# Patient Record
Sex: MALE | Race: WHITE | Employment: OTHER | URBAN - NONMETROPOLITAN AREA
[De-identification: names, ages, dates, MRNs, and addresses within clinical notes are randomized per-mention and may not be internally consistent; named-entity substitution may affect disease eponyms.]

---

## 2018-02-01 ENCOUNTER — DOCUMENTATION ONLY (OUTPATIENT)
Dept: FAMILY MEDICINE | Facility: OTHER | Age: 53
End: 2018-02-01

## 2018-02-01 PROBLEM — F41.9 ANXIETY: Status: ACTIVE | Noted: 2018-02-01

## 2018-02-01 PROBLEM — S09.90XA: Status: ACTIVE | Noted: 2018-02-01

## 2018-02-01 PROBLEM — F10.11 HISTORY OF ALCOHOL ABUSE: Status: ACTIVE | Noted: 2018-02-01

## 2018-02-01 PROBLEM — Z87.898 HISTORY OF SEIZURE: Status: ACTIVE | Noted: 2018-02-01

## 2018-02-01 PROBLEM — I10 HYPERTENSION: Status: ACTIVE | Noted: 2018-02-01

## 2018-02-01 PROBLEM — S02.91XA: Status: ACTIVE | Noted: 2018-02-01

## 2018-02-01 RX ORDER — BUPROPION HYDROCHLORIDE 150 MG/1
1 TABLET, EXTENDED RELEASE ORAL 2 TIMES DAILY
COMMUNITY
Start: 2016-06-09 | End: 2018-10-05

## 2018-02-01 RX ORDER — LISDEXAMFETAMINE DIMESYLATE 60 MG/1
CAPSULE ORAL
COMMUNITY
Start: 2016-08-18 | End: 2018-10-05

## 2018-02-01 RX ORDER — LEVETIRACETAM 750 MG/1
1500 TABLET, FILM COATED, EXTENDED RELEASE ORAL AT BEDTIME
COMMUNITY
Start: 2016-08-18 | End: 2018-10-05

## 2018-02-01 RX ORDER — KETOCONAZOLE 20 MG/ML
SHAMPOO TOPICAL
COMMUNITY
Start: 2016-09-09 | End: 2018-10-05

## 2018-09-12 ENCOUNTER — APPOINTMENT (OUTPATIENT)
Dept: CT IMAGING | Facility: OTHER | Age: 53
End: 2018-09-12
Attending: FAMILY MEDICINE
Payer: MEDICAID

## 2018-09-12 ENCOUNTER — HOSPITAL ENCOUNTER (OUTPATIENT)
Facility: OTHER | Age: 53
Setting detail: OBSERVATION
Discharge: HOME | End: 2018-09-13
Attending: FAMILY MEDICINE | Admitting: INTERNAL MEDICINE
Payer: MEDICAID

## 2018-09-12 ENCOUNTER — APPOINTMENT (OUTPATIENT)
Dept: GENERAL RADIOLOGY | Facility: OTHER | Age: 53
End: 2018-09-12
Attending: FAMILY MEDICINE
Payer: MEDICAID

## 2018-09-12 DIAGNOSIS — G40.909 RECURRENT SEIZURES (H): ICD-10-CM
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
## 2018-09-12 LAB
ALBUMIN SERPL-MCNC: 4.8 G/DL (ref 3.5–5.7)
ALP SERPL-CCNC: 107 U/L (ref 34–104)
ALT SERPL W P-5'-P-CCNC: 33 U/L (ref 7–52)
ANION GAP SERPL CALCULATED.3IONS-SCNC: 20 MMOL/L (ref 3–14)
AST SERPL W P-5'-P-CCNC: 41 U/L (ref 13–39)
BASOPHILS # BLD AUTO: 0.1 10E9/L (ref 0–0.2)
BASOPHILS NFR BLD AUTO: 0.8 %
BILIRUB SERPL-MCNC: 0.4 MG/DL (ref 0.3–1)
BUN SERPL-MCNC: 24 MG/DL (ref 7–25)
CALCIUM SERPL-MCNC: 10.6 MG/DL (ref 8.6–10.3)
CHLORIDE SERPL-SCNC: 100 MMOL/L (ref 98–107)
CO2 SERPL-SCNC: 18 MMOL/L (ref 21–31)
CREAT SERPL-MCNC: 1.46 MG/DL (ref 0.7–1.3)
DIFFERENTIAL METHOD BLD: ABNORMAL
EOSINOPHIL # BLD AUTO: 0 10E9/L (ref 0–0.7)
EOSINOPHIL NFR BLD AUTO: 0.2 %
ERYTHROCYTE [DISTWIDTH] IN BLOOD BY AUTOMATED COUNT: 13 % (ref 10–15)
ETHANOL SERPL-MCNC: <0.01 %
GFR SERPL CREATININE-BSD FRML MDRD: 50 ML/MIN/1.7M2
GLUCOSE SERPL-MCNC: 137 MG/DL (ref 70–105)
HCT VFR BLD AUTO: 41.8 % (ref 40–53)
HGB BLD-MCNC: 14.5 G/DL (ref 13.3–17.7)
IMM GRANULOCYTES # BLD: 0 10E9/L (ref 0–0.4)
IMM GRANULOCYTES NFR BLD: 0.3 %
LYMPHOCYTES # BLD AUTO: 2.3 10E9/L (ref 0.8–5.3)
LYMPHOCYTES NFR BLD AUTO: 16.1 %
MAGNESIUM SERPL-MCNC: 2.1 MG/DL (ref 1.9–2.7)
MCH RBC QN AUTO: 31.3 PG (ref 26.5–33)
MCHC RBC AUTO-ENTMCNC: 34.7 G/DL (ref 31.5–36.5)
MCV RBC AUTO: 90 FL (ref 78–100)
MONOCYTES # BLD AUTO: 0.9 10E9/L (ref 0–1.3)
MONOCYTES NFR BLD AUTO: 6.3 %
NEUTROPHILS # BLD AUTO: 11.1 10E9/L (ref 1.6–8.3)
NEUTROPHILS NFR BLD AUTO: 76.3 %
PLATELET # BLD AUTO: 267 10E9/L (ref 150–450)
POTASSIUM SERPL-SCNC: 4.3 MMOL/L (ref 3.5–5.1)
PROT SERPL-MCNC: 8.3 G/DL (ref 6.4–8.9)
RBC # BLD AUTO: 4.63 10E12/L (ref 4.4–5.9)
SODIUM SERPL-SCNC: 138 MMOL/L (ref 134–144)
WBC # BLD AUTO: 14.6 10E9/L (ref 4–11)

## 2018-09-12 PROCEDURE — 99291 CRITICAL CARE FIRST HOUR: CPT | Mod: 25 | Performed by: FAMILY MEDICINE

## 2018-09-12 PROCEDURE — 80320 DRUG SCREEN QUANTALCOHOLS: CPT | Performed by: FAMILY MEDICINE

## 2018-09-12 PROCEDURE — 12013 RPR F/E/E/N/L/M 2.6-5.0 CM: CPT | Mod: Z6 | Performed by: FAMILY MEDICINE

## 2018-09-12 PROCEDURE — 12013 RPR F/E/E/N/L/M 2.6-5.0 CM: CPT | Performed by: FAMILY MEDICINE

## 2018-09-12 PROCEDURE — 80053 COMPREHEN METABOLIC PANEL: CPT | Performed by: FAMILY MEDICINE

## 2018-09-12 PROCEDURE — 73030 X-RAY EXAM OF SHOULDER: CPT | Mod: LT

## 2018-09-12 PROCEDURE — 99220 ZZC INITIAL OBSERVATION CARE,LEVL III: CPT | Performed by: INTERNAL MEDICINE

## 2018-09-12 PROCEDURE — G0378 HOSPITAL OBSERVATION PER HR: HCPCS

## 2018-09-12 PROCEDURE — 83735 ASSAY OF MAGNESIUM: CPT | Performed by: INTERNAL MEDICINE

## 2018-09-12 PROCEDURE — 36415 COLL VENOUS BLD VENIPUNCTURE: CPT | Performed by: FAMILY MEDICINE

## 2018-09-12 PROCEDURE — 71045 X-RAY EXAM CHEST 1 VIEW: CPT

## 2018-09-12 PROCEDURE — 85025 COMPLETE CBC W/AUTO DIFF WBC: CPT | Performed by: FAMILY MEDICINE

## 2018-09-12 PROCEDURE — 72125 CT NECK SPINE W/O DYE: CPT

## 2018-09-12 PROCEDURE — 70450 CT HEAD/BRAIN W/O DYE: CPT

## 2018-09-12 PROCEDURE — 25000132 ZZH RX MED GY IP 250 OP 250 PS 637: Performed by: INTERNAL MEDICINE

## 2018-09-12 RX ORDER — BISACODYL 10 MG
10 SUPPOSITORY, RECTAL RECTAL DAILY PRN
Status: DISCONTINUED | OUTPATIENT
Start: 2018-09-12 | End: 2018-09-13 | Stop reason: HOSPADM

## 2018-09-12 RX ORDER — ONDANSETRON 4 MG/1
4 TABLET, ORALLY DISINTEGRATING ORAL EVERY 6 HOURS PRN
Status: DISCONTINUED | OUTPATIENT
Start: 2018-09-12 | End: 2018-09-13 | Stop reason: HOSPADM

## 2018-09-12 RX ORDER — ACETAMINOPHEN 325 MG/1
650 TABLET ORAL EVERY 4 HOURS PRN
Status: DISCONTINUED | OUTPATIENT
Start: 2018-09-12 | End: 2018-09-13 | Stop reason: HOSPADM

## 2018-09-12 RX ORDER — AMOXICILLIN 250 MG
1 CAPSULE ORAL 2 TIMES DAILY PRN
Status: DISCONTINUED | OUTPATIENT
Start: 2018-09-12 | End: 2018-09-13 | Stop reason: HOSPADM

## 2018-09-12 RX ORDER — LEVETIRACETAM 500 MG/1
500 TABLET ORAL 2 TIMES DAILY
Status: DISCONTINUED | OUTPATIENT
Start: 2018-09-12 | End: 2018-09-13 | Stop reason: HOSPADM

## 2018-09-12 RX ORDER — POTASSIUM CHLORIDE 1500 MG/1
20-40 TABLET, EXTENDED RELEASE ORAL
Status: DISCONTINUED | OUTPATIENT
Start: 2018-09-12 | End: 2018-09-13 | Stop reason: HOSPADM

## 2018-09-12 RX ORDER — MAGNESIUM SULFATE HEPTAHYDRATE 40 MG/ML
4 INJECTION, SOLUTION INTRAVENOUS EVERY 4 HOURS PRN
Status: DISCONTINUED | OUTPATIENT
Start: 2018-09-12 | End: 2018-09-13 | Stop reason: HOSPADM

## 2018-09-12 RX ORDER — LIDOCAINE 40 MG/G
CREAM TOPICAL
Status: DISCONTINUED | OUTPATIENT
Start: 2018-09-12 | End: 2018-09-13 | Stop reason: HOSPADM

## 2018-09-12 RX ORDER — PROCHLORPERAZINE MALEATE 10 MG
10 TABLET ORAL EVERY 6 HOURS PRN
Status: DISCONTINUED | OUTPATIENT
Start: 2018-09-12 | End: 2018-09-13 | Stop reason: HOSPADM

## 2018-09-12 RX ORDER — AMOXICILLIN 250 MG
2 CAPSULE ORAL 2 TIMES DAILY PRN
Status: DISCONTINUED | OUTPATIENT
Start: 2018-09-12 | End: 2018-09-13 | Stop reason: HOSPADM

## 2018-09-12 RX ORDER — NALOXONE HYDROCHLORIDE 0.4 MG/ML
.1-.4 INJECTION, SOLUTION INTRAMUSCULAR; INTRAVENOUS; SUBCUTANEOUS
Status: DISCONTINUED | OUTPATIENT
Start: 2018-09-12 | End: 2018-09-13 | Stop reason: HOSPADM

## 2018-09-12 RX ORDER — ONDANSETRON 2 MG/ML
4 INJECTION INTRAMUSCULAR; INTRAVENOUS EVERY 6 HOURS PRN
Status: DISCONTINUED | OUTPATIENT
Start: 2018-09-12 | End: 2018-09-13 | Stop reason: HOSPADM

## 2018-09-12 RX ORDER — POTASSIUM CHLORIDE 7.45 MG/ML
10 INJECTION INTRAVENOUS
Status: DISCONTINUED | OUTPATIENT
Start: 2018-09-12 | End: 2018-09-13 | Stop reason: HOSPADM

## 2018-09-12 RX ORDER — PROCHLORPERAZINE 25 MG
25 SUPPOSITORY, RECTAL RECTAL EVERY 12 HOURS PRN
Status: DISCONTINUED | OUTPATIENT
Start: 2018-09-12 | End: 2018-09-13 | Stop reason: HOSPADM

## 2018-09-12 RX ORDER — ACETAMINOPHEN 650 MG/1
650 SUPPOSITORY RECTAL EVERY 4 HOURS PRN
Status: DISCONTINUED | OUTPATIENT
Start: 2018-09-12 | End: 2018-09-13 | Stop reason: HOSPADM

## 2018-09-12 RX ORDER — NICOTINE 21 MG/24HR
1 PATCH, TRANSDERMAL 24 HOURS TRANSDERMAL DAILY
Status: DISCONTINUED | OUTPATIENT
Start: 2018-09-12 | End: 2018-09-13 | Stop reason: HOSPADM

## 2018-09-12 RX ADMIN — ACETAMINOPHEN 650 MG: 325 TABLET, FILM COATED ORAL at 19:31

## 2018-09-12 RX ADMIN — ACETAMINOPHEN 650 MG: 325 TABLET, FILM COATED ORAL at 23:24

## 2018-09-12 RX ADMIN — LEVETIRACETAM 500 MG: 500 TABLET ORAL at 21:18

## 2018-09-12 RX ADMIN — NICOTINE 1 PATCH: 14 PATCH TRANSDERMAL at 19:30

## 2018-09-12 NOTE — H&P
[facility]    History and Physical  Hospitalist       Date of Admission:  9/12/2018    Assessment & Plan   Omar Barrios is a 53 year old male who presents with a fall and seizure.    Active Problems:    Fall at home    Assessment: Is unclear from any history that we can obtain whether the patient fell first, hit his head and had a seizure or had a seizure and then fell.  He has been off his Keppra seizure medication since at least May 2018.  He has a large hematoma and laceration on his forehead.  He was quite groggy initially which could have been postconcussion or post ictal state.  He is now answering questions appropriately and interacting appropriately.    Plan: Observation status.  Consult general surgery for trauma.  Restart Keppra at 500 mg twice daily with a target of 1500 mg twice daily over the next month.    Post-concussion syndrome  Assessment: secondary to fall and striking head    Seizure disorder  Assessment: non compliance with antiepileptics.   Plan: Patient will restart his Keppra. No driving.    Shoulder pain  Assessment: Probably bruise from fall.  Shoulder x-ray does not rule out possibility of fracture.    Plan: if pain continues, CT of the shoulder.    # Pain Assessment:  Current Pain Score 9/12/2018   Pain score (0-10) 0   - Omar is experiencing pain due to fall. Pain management was discussed and the plan was created in a collaborative fashion.  Omar's response to the current recommendations: engaged  - Opioid regimen: oxycodone  - Response to opioid medications: Reduction of symptoms   - Bowel regimen: not needed        DVT Prophylaxis: Pneumatic Compression Devices  Code Status: No Order    Charles Weaver    Primary Care Physician   [place]  Chief Complaint   Fall, head trauma    History is obtained from the patient and chart review.    History of Present Illness   Omar Barrios is a 53 year old male who presents with a fall and striking his head on what  appears to be the edge of the oven in the kitchen.  So had seizure-like activity while on the ground after the fall and was postictal and not very responsive.  The fall was not witnessed.  It is unclear if the seizure started first and caused the fall or if the fall and striking the head initiated a seizure.  He presented to the emergency department with a large forehead laceration that was sutured by Dr. Walters.  Patient had a difficult time giving any history and was not very responsive initially.  This is improved during his hospital stay and he was alert and visiting with me when I saw him.  He takes no blood thinners on a chronic basis.  He takes no medications at this time, and is reportedly off of any prescription medications since at least May 2018.  This includes the Keppra 1500 mg twice daily.  He has a history of drug use but denies using anything for at least the past 30 days.  He recently was released from intermediate.    Past Medical History    I have reviewed this patient's medical history and updated it with pertinent information if needed.   Complex partial seizures  MORGAN  hypertension   Tobacco use      Past Surgical History   I have reviewed this patient's surgical history and updated it with pertinent information if needed.  No past surgical history on file.    Prior to Admission Medications   None     Allergies   No Known Allergies    Social History   I have reviewed this patient's social history and updated it with pertinent information if needed. Omar Barrios      Family History   I have reviewed this patient's family history and updated it with pertinent information if needed.   No family history on file.    Review of Systems   Review Of Systems  Skin: positive for bruising, forehead lac   Eyes: negative  Ears/Nose/Throat: negative  Respiratory: No shortness of breath, dyspnea on exertion, cough, or hemoptysis  Cardiovascular: negative  Gastrointestinal: negative  Genitourinary:  negative  Musculoskeletal: left shoulder pain  Neurologic: seizures  Psychiatric: negative  Hematologic/Lymphatic/Immunologic: negative  Endocrine: negative      Physical Exam   Temp: 96  F (35.6  C) Temp src: Temporal BP: (!) 135/99 Pulse: 144 Heart Rate: 124 Resp: 10 SpO2: 97 % O2 Device: None (Room air)    Vital Signs with Ranges  Temp:  [96  F (35.6  C)] 96  F (35.6  C)  Pulse:  [144] 144  Heart Rate:  [116-134] 124  Resp:  [8-24] 10  BP: (117-158)/() 135/99  SpO2:  [94 %-99 %] 97 %  180 lbs 0 oz    Constitutional: In no apparent distress  Eyes: pupils reactive, extraocular movements intact. Anicteric sclera.   HEENT: TM's normal, oropharynx nonerythematous. Neck supple, no JVD.  Respiratory: no crackles noted, no wheezes.  Cardiovascular: regular, no murmur. No lower extremity edema.  GI: soft, non-tender, bowel sounds present.  Lymph/Hematologic: no cervical or supraclavicular LAD.  Genitourinary: deferred  Skin: brusing over forehead and left face  Musculoskeletal: no joint erythema or swelling  Neurologic: cranial nerves symmetric. Neuro exam nonfocal  Psychiatric: alert and oriented x3. Interactive.       Data   Data reviewed today:  I personally reviewed labs from today. Imaging reports as below.     Recent Labs  Lab 09/12/18  1346   WBC 14.6*   HGB 14.5   MCV 90         POTASSIUM 4.3   CHLORIDE 100   CO2 18*   BUN 24   CR 1.46*   ANIONGAP 20*   JONES 10.6*   *   ALBUMIN 4.8   PROTTOTAL 8.3   BILITOTAL 0.4   ALKPHOS 107*   ALT 33   AST 41*       Recent Results (from the past 24 hour(s))   CT Head w/o Contrast    Narrative    PROCEDURE: CT HEAD W/O CONTRAST     HISTORY: Seizure with trauma from falling and hitting head; .    COMPARISON: None.    TECHNIQUE: Helical Images were obtained from the skull base to the  vertex. Axial, coronal and sagittal images were reviewed.     FINDINGS: There is mild, diffuse atrophy. No acute intracranial  hemorrhage, mass effect,  or midline  shift.    The grey-white matter interface is preserved. Scattered  hypoattenuation within the white matter is most compatible with  microvascular ischemic change. Encephalomalacia is noted in the left  inferior temporal region.    The mastoid air cells are clear.  There are postoperative changes from  right frontoparietal craniotomy. No acute skull fractures are seen.       Impression    IMPRESSION: No acute intracranial findings.      ROGER DUARTE MD   CT Cervical Spine w/o Contrast    Narrative    PROCEDURE: CT CERVICAL SPINE W/O CONTRAST     HISTORY: Seizure which led to a fall striking his head; .    TECHNIQUE: Helical noncontrast CT images of the cervical spine.    COMPARISON: None.    FINDINGS:     No acute fracture is identified. The vertebral bodies are normal in  height. The cervical lordosis is straightened. The C1-2 articulation  and the craniocervical junction are intact.     Diffuse cervical degenerative changes are present, most pronounced at  C6-7 and C7-T1 without severe spinal stenosis. Right foraminal  stenosis at C7-T1 is moderate to severe.     The paravertebral soft tissues are unremarkable. The lung apices  demonstrate mild emphysema.      Impression    IMPRESSION: No evidence of acute cervical spine fracture.    RALEIGH THURMAN MD   XR Shoulder Left Port G/E 2 Views    Narrative    PROCEDURE:  XR SHOULDER LT PORT G/E 2 VW    HISTORY: Trauma to shoulder after a seizure;     COMPARISON:  None.    TECHNIQUE:  2 views of the left shoulder were obtained.    FINDINGS:  There is deformity of the glenohumeral joint, the majority  which is likely due to degenerative disease. There is joint space  narrowing and osteophytes. There are mild degenerative changes of the  left acromioclavicular joint.       Impression    IMPRESSION: Deformity of the glenohumeral joint, most likely  degenerative. This makes it difficult to exclude a subtle fracture. If  there is persistent pain, consider follow-up CT.       ROGER DUARTE MD   XR Chest Port 1 View    Narrative    PROCEDURE:  XR CHEST PORT 1 VW    HISTORY:  Trauma to left shoulder after seizure; .     COMPARISON:  None.    FINDINGS:   The cardiac silhouette is normal in size. The pulmonary vasculature is  normal.  The lungs are clear. No pleural effusion or pneumothorax.      Impression    IMPRESSION:  No acute cardiopulmonary disease.      ROGER DUARTE MD

## 2018-09-12 NOTE — IP AVS SNAPSHOT
[facility]    [address]    [place] [address]    Phone:  [phone]    Fax:  [phone]                                       After Visit Summary   9/12/2018    Omar Barrios    MRN: [id number]           After Visit Summary Signature Page     I have received my discharge instructions, and my questions have been answered. I have discussed any challenges I see with this plan with the nurse or doctor.    ..........................................................................................................................................  Patient/Patient Representative Signature      ..........................................................................................................................................  Patient Representative Print Name and Relationship to Patient    ..................................................               ................................................  Date                                   Time    ..........................................................................................................................................  Reviewed by Signature/Title    ...................................................              ..............................................  Date                                               Time          22EPIC Rev 08/18
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
## 2018-09-12 NOTE — IP AVS SNAPSHOT
MRN:[id number]                      After Visit Summary   9/12/2018    Omar Barrios    MRN: [id number]           Thank you!     Thank you for choosing [facility] for your care. Our goal is always to provide you with excellent care. Hearing back from our patients is one way we can continue to improve our services. Please take a few minutes to complete the written survey that you may receive in the mail after you visit with us. Thank you!        Patient Information     Date Of Birth          1965        About your hospital stay     You were admitted on:  September 12, 2018 You last received care in the:  [facility]    You were discharged on:  September 13, 2018        Reason for your hospital stay       Fall with seizure                  Who to Call     For medical emergencies, please call 911.  For non-urgent questions about your medical care, please call your primary care provider or clinic, [phone]          Attending Provider     Provider Specialty    Jcarlos Walters MD Specialist    Charles Weaver MD Internal Medicine       Primary Care Provider Office Phone # Fax #    [facility] [phone]      After Care Instructions     Activity       Your activity upon discharge: activity as tolerated            Diet       Follow this diet upon discharge: Orders Placed This Encounter      Combination Diet Regular Diet Adult                    Follow-up Appointments     Follow-up and recommended labs and tests       Follow up with Dr. Jake Tello  on 9/24 at 2PM.                  Your next 10 appointments already scheduled     Sep 24, 2018  2:00 PM CDT   Office Visit with Jake Tello MD   [facility] ([facility])    [address]  [facility] [address] [phone]           Bring a current list of meds and any records pertaining to this visit. For Physicals, please bring immunization  "records and any forms needing to be filled out. Please arrive 10 minutes early to complete paperwork.              Pending Results     No orders found for last 3 day(s).            Statement of Approval     Ordered          18 1008  I have reviewed and agree with all the recommendations and orders detailed in this document.  EFFECTIVE NOW     Approved and electronically signed by:  Charles Weaver MD             Admission Information     Date & Time Provider Department Dept. Phone    2018 Charles Weaver MD [facility] [phone]      Your Vitals Were     Blood Pressure Pulse Temperature Respirations Height Weight    137/76 144 97.7  F (36.5  C) (Oral) 18 1.676 m (5' 6\") 77.3 kg (170 lb 6.4 oz)    Pulse Oximetry BMI (Body Mass Index)                98% 27.5 kg/m2          MyChart Information     [employer] lets you send messages to your doctor, view your test results, renew your prescriptions, schedule appointments and more. To sign up, go to [email or web address] . Click on \"Log in\" on the left side of the screen, which will take you to the Welcome page. Then click on \"Sign up Now\" on the right side of the page.     You will be asked to enter the access code listed below, as well as some personal information. Please follow the directions to create your username and password.     Your access code is: [email or web address]  Expires: 2018 10:15 AM     Your access code will  in 90 days. If you need help or a new code, please call your [place] clinic or [phone].        Care EveryWhere ID     This is your Care EveryWhere ID. This could be used by other organizations to access your [facility] medical records  [id number]        Equal Access to Services     [facility] : Geremias Snowden, vasiliy espino, jacinto potter. [facility] [phone].    ATENCIÓN: Si habla español, tiene a domingo disposición servicios " tommy de asistencia lingüística. Korin fernández [phone].    We comply with applicable federal civil rights laws and [place] laws. We do not discriminate on the basis of race, color, national origin, age, disability, sex, sexual orientation, or gender identity.               Review of your medicines      START taking        Dose / Directions    levETIRAcetam 500 MG tablet   Commonly known as:  KEPPRA   Used for:  Recurrent seizures (H)        Dose:  500 mg   Take 1 tablet (500 mg) by mouth 2 times daily   Quantity:  60 tablet   Refills:  1       oxyCODONE-acetaminophen 5-325 MG per tablet   Commonly known as:  PERCOCET        Dose:  1 tablet   Take 1 tablet by mouth every 6 hours as needed for pain   Quantity:  12 tablet   Refills:  0            Where to get your medicines      These medications were sent to [facility], - [place], [place] - [address]  [address], [place] [address]     Phone:  [phone]     levETIRAcetam 500 MG tablet         Some of these will need a paper prescription and others can be bought over the counter. Ask your nurse if you have questions.     Bring a paper prescription for each of these medications     oxyCODONE-acetaminophen 5-325 MG per tablet                Protect others around you: Learn how to safely use, store and throw away your medicines at www.disposemymeds.org.        Information about OPIOIDS     PRESCRIPTION OPIOIDS: WHAT YOU NEED TO KNOW   We gave you an opioid (narcotic) pain medicine. It is important to manage your pain, but opioids are not always the best choice. You should first try all the other options your care team gave you. Take this medicine for as short a time (and as few doses) as possible.    Some activities can increase your pain, such as bandage changes or therapy sessions. It may help to take your pain medicine 30 to 60 minutes before these activities. Reduce your stress by getting enough sleep, working on  hobbies you enjoy and practicing relaxation or meditation. Talk to your care team about ways to manage your pain beyond prescription opioids.    These medicines have risks:    DO NOT drive when on new or higher doses of pain medicine. These medicines can affect your alertness and reaction times, and you could be arrested for driving under the influence (DUI). If you need to use opioids long-term, talk to your care team about driving.    DO NOT operate heavy machinery    DO NOT do any other dangerous activities while taking these medicines.    DO NOT drink any alcohol while taking these medicines.     If the opioid prescribed includes acetaminophen, DO NOT take with any other medicines that contain acetaminophen. Read all labels carefully. Look for the word  acetaminophen  or  Tylenol.  Ask your pharmacist if you have questions or are unsure.    You can get addicted to pain medicines, especially if you have a history of addiction (chemical, alcohol or substance dependence). Talk to your care team about ways to reduce this risk.    All opioids tend to cause constipation. Drink plenty of water and eat foods that have a lot of fiber, such as fruits, vegetables, prune juice, apple juice and high-fiber cereal. Take a laxative (Miralax, milk of magnesia, Colace, Senna) if you don t move your bowels at least every other day. Other side effects include upset stomach, sleepiness, dizziness, throwing up, tolerance (needing more of the medicine to have the same effect), physical dependence and slowed breathing.    Store your pills in a secure place, locked if possible. We will not replace any lost or stolen medicine. If you don t finish your medicine, please throw away (dispose) as directed by your pharmacist. The [place] Pollution Control [employer] has more information about safe disposal: https://www.pca.[facility].[place].us/living-green/managing-unwanted-medications             Medication List: This is a list of all your medications  and when to take them. Check marks below indicate your daily home schedule. Keep this list as a reference.      Medications           Morning Afternoon Evening Bedtime As Needed    levETIRAcetam 500 MG tablet   Commonly known as:  KEPPRA   Take 1 tablet (500 mg) by mouth 2 times daily   Last time this was given:  500 mg on 9/12/2018  9:18 PM                                oxyCODONE-acetaminophen 5-325 MG per tablet   Commonly known as:  PERCOCET   Take 1 tablet by mouth every 6 hours as needed for pain

## 2018-09-12 NOTE — ED TRIAGE NOTES
Pt here by meds 1 into bay 2, pt was at his house when his friends saw him fall backwards due to a seizure, pt hit the back of his head on the stove, pt has a laceration, upon arrival for EMS, pt was unresponsive, pt is currently awake and answering questions but is confused, VSS, pt denies pain, pt is backboarded and c-collared, MD into see pt, pt has a hx of seizures

## 2018-09-13 ENCOUNTER — APPOINTMENT (OUTPATIENT)
Dept: PHYSICAL THERAPY | Facility: OTHER | Age: 53
End: 2018-09-13
Attending: INTERNAL MEDICINE
Payer: MEDICAID

## 2018-09-13 ENCOUNTER — APPOINTMENT (OUTPATIENT)
Dept: OCCUPATIONAL THERAPY | Facility: OTHER | Age: 53
End: 2018-09-13
Attending: INTERNAL MEDICINE
Payer: MEDICAID

## 2018-09-13 VITALS
HEART RATE: 144 BPM | TEMPERATURE: 97.7 F | DIASTOLIC BLOOD PRESSURE: 76 MMHG | BODY MASS INDEX: 27.38 KG/M2 | RESPIRATION RATE: 18 BRPM | SYSTOLIC BLOOD PRESSURE: 137 MMHG | WEIGHT: 170.4 LBS | OXYGEN SATURATION: 98 % | HEIGHT: 66 IN

## 2018-09-13 LAB
ALBUMIN UR-MCNC: ABNORMAL MG/DL
AMPHETAMINES UR QL SCN: ABNORMAL
ANION GAP SERPL CALCULATED.3IONS-SCNC: 11 MMOL/L (ref 3–14)
APPEARANCE UR: CLEAR
BARBITURATES UR QL: NOT DETECTED
BENZODIAZ UR QL: NOT DETECTED
BILIRUB UR QL STRIP: NEGATIVE
BUN SERPL-MCNC: 17 MG/DL (ref 7–25)
BUPRENORPHINE UR QL: NOT DETECTED NG/ML
CALCIUM SERPL-MCNC: 9.7 MG/DL (ref 8.6–10.3)
CANNABINOIDS UR QL: NOT DETECTED NG/ML
CHLORIDE SERPL-SCNC: 101 MMOL/L (ref 98–107)
CO2 SERPL-SCNC: 23 MMOL/L (ref 21–31)
COCAINE UR QL: NOT DETECTED
COLOR UR AUTO: YELLOW
CREAT SERPL-MCNC: 1.02 MG/DL (ref 0.7–1.3)
D-METHAMPHET UR QL: ABNORMAL NG/ML
ERYTHROCYTE [DISTWIDTH] IN BLOOD BY AUTOMATED COUNT: 13.3 % (ref 10–15)
GFR SERPL CREATININE-BSD FRML MDRD: 76 ML/MIN/1.7M2
GLUCOSE SERPL-MCNC: 101 MG/DL (ref 70–105)
GLUCOSE UR STRIP-MCNC: NEGATIVE MG/DL
HCT VFR BLD AUTO: 39.7 % (ref 40–53)
HGB BLD-MCNC: 13.2 G/DL (ref 13.3–17.7)
HGB UR QL STRIP: NEGATIVE
HYALINE CASTS #/AREA URNS LPF: ABNORMAL /LPF
KETONES UR STRIP-MCNC: 15 MG/DL
LEUKOCYTE ESTERASE UR QL STRIP: NEGATIVE
MAGNESIUM SERPL-MCNC: 2.1 MG/DL (ref 1.9–2.7)
MCH RBC QN AUTO: 30.7 PG (ref 26.5–33)
MCHC RBC AUTO-ENTMCNC: 33.2 G/DL (ref 31.5–36.5)
MCV RBC AUTO: 92 FL (ref 78–100)
METHADONE UR QL SCN: NOT DETECTED
MUCOUS THREADS #/AREA URNS LPF: PRESENT /LPF
NITRATE UR QL: NEGATIVE
OPIATES UR QL SCN: NOT DETECTED
OXYCODONE UR QL: NOT DETECTED NG/ML
PCP UR QL SCN: NOT DETECTED
PH UR STRIP: 5.5 PH (ref 5–9)
PLATELET # BLD AUTO: 216 10E9/L (ref 150–450)
POTASSIUM SERPL-SCNC: 3.4 MMOL/L (ref 3.5–5.1)
PROPOXYPH UR QL: NOT DETECTED NG/ML
RBC # BLD AUTO: 4.3 10E12/L (ref 4.4–5.9)
RBC #/AREA URNS AUTO: ABNORMAL /HPF
SODIUM SERPL-SCNC: 135 MMOL/L (ref 134–144)
SOURCE: ABNORMAL
SP GR UR STRIP: >1.03 (ref 1–1.03)
SPERM #/AREA URNS HPF: PRESENT /HPF
TRICYCLICS UR QL SCN: NOT DETECTED NG/ML
UROBILINOGEN UR STRIP-ACNC: 0.2 EU/DL (ref 0.2–1)
WBC # BLD AUTO: 9.9 10E9/L (ref 4–11)
WBC #/AREA URNS AUTO: ABNORMAL /HPF

## 2018-09-13 PROCEDURE — 40000193 ZZH STATISTIC PT WARD VISIT

## 2018-09-13 PROCEDURE — G8979 MOBILITY GOAL STATUS: HCPCS | Mod: GP,CI

## 2018-09-13 PROCEDURE — 81001 URINALYSIS AUTO W/SCOPE: CPT | Performed by: FAMILY MEDICINE

## 2018-09-13 PROCEDURE — G0378 HOSPITAL OBSERVATION PER HR: HCPCS

## 2018-09-13 PROCEDURE — G8988 SELF CARE GOAL STATUS: HCPCS | Mod: GO,CI | Performed by: OCCUPATIONAL THERAPIST

## 2018-09-13 PROCEDURE — G8987 SELF CARE CURRENT STATUS: HCPCS | Mod: GO,CI | Performed by: OCCUPATIONAL THERAPIST

## 2018-09-13 PROCEDURE — 80048 BASIC METABOLIC PNL TOTAL CA: CPT | Performed by: INTERNAL MEDICINE

## 2018-09-13 PROCEDURE — 40000133 ZZH STATISTIC OT WARD VISIT: Performed by: OCCUPATIONAL THERAPIST

## 2018-09-13 PROCEDURE — 25000132 ZZH RX MED GY IP 250 OP 250 PS 637: Performed by: INTERNAL MEDICINE

## 2018-09-13 PROCEDURE — 99217 ZZC OBSERVATION CARE DISCHARGE: CPT | Performed by: INTERNAL MEDICINE

## 2018-09-13 PROCEDURE — 83735 ASSAY OF MAGNESIUM: CPT | Performed by: INTERNAL MEDICINE

## 2018-09-13 PROCEDURE — G8980 MOBILITY D/C STATUS: HCPCS | Mod: GP,CI

## 2018-09-13 PROCEDURE — 97116 GAIT TRAINING THERAPY: CPT | Mod: GP

## 2018-09-13 PROCEDURE — 80307 DRUG TEST PRSMV CHEM ANLYZR: CPT | Performed by: FAMILY MEDICINE

## 2018-09-13 PROCEDURE — G8978 MOBILITY CURRENT STATUS: HCPCS | Mod: GP,CI

## 2018-09-13 PROCEDURE — 36415 COLL VENOUS BLD VENIPUNCTURE: CPT | Performed by: INTERNAL MEDICINE

## 2018-09-13 PROCEDURE — 97165 OT EVAL LOW COMPLEX 30 MIN: CPT | Mod: GO | Performed by: OCCUPATIONAL THERAPIST

## 2018-09-13 PROCEDURE — 97535 SELF CARE MNGMENT TRAINING: CPT | Mod: GO,XU | Performed by: OCCUPATIONAL THERAPIST

## 2018-09-13 PROCEDURE — 97161 PT EVAL LOW COMPLEX 20 MIN: CPT | Mod: GP

## 2018-09-13 PROCEDURE — G8989 SELF CARE D/C STATUS: HCPCS | Mod: GO,CI | Performed by: OCCUPATIONAL THERAPIST

## 2018-09-13 PROCEDURE — 97530 THERAPEUTIC ACTIVITIES: CPT | Mod: GP

## 2018-09-13 PROCEDURE — 85027 COMPLETE CBC AUTOMATED: CPT | Performed by: INTERNAL MEDICINE

## 2018-09-13 PROCEDURE — 99241 ZZC OFFICE CONSULTATION,LEVEL I: CPT | Mod: 25 | Performed by: SURGERY

## 2018-09-13 RX ORDER — OXYCODONE AND ACETAMINOPHEN 5; 325 MG/1; MG/1
1 TABLET ORAL EVERY 6 HOURS PRN
Qty: 12 TABLET | Refills: 0 | Status: SHIPPED | OUTPATIENT
Start: 2018-09-13 | End: 2018-10-05

## 2018-09-13 RX ORDER — LEVETIRACETAM 500 MG/1
500 TABLET ORAL 2 TIMES DAILY
Qty: 60 TABLET | Refills: 1 | Status: SHIPPED | OUTPATIENT
Start: 2018-09-13 | End: 2018-10-12

## 2018-09-13 RX ADMIN — LEVETIRACETAM 500 MG: 500 TABLET ORAL at 10:29

## 2018-09-13 RX ADMIN — ACETAMINOPHEN 650 MG: 325 TABLET, FILM COATED ORAL at 03:32

## 2018-09-13 NOTE — PLAN OF CARE
Problem: Patient Care Overview  Goal: Plan of Care/Patient Progress Review  Occupational Therapy Discharge Summary    Reason for therapy discharge:    Discharged to home.    Progress towards therapy goal(s). See goals on Care Plan in [facility] electronic health record for goal details.  Goals met    Therapy recommendation(s):    No further therapy is recommended.

## 2018-09-13 NOTE — PROGRESS NOTES
09/13/18 1400   Quick Adds   Type of Visit Initial PT Evaluation   Living Environment   Lives With alone   Living Arrangements house   Home Accessibility no concerns   Transportation Available car   Functional Level Prior   Ambulation 0-->independent   Transferring 0-->independent   Toileting 0-->independent   Communication 0-->understands/communicates without difficulty   Swallowing 0-->swallows foods/liquids without difficulty   Cognition 0 - no cognition issues reported   Fall history within last six months yes   Which of the above functional risks had a recent onset or change? ambulation   General Information   Referring Physician Meg   Patient/Family Goals Statement return home   Precautions/Limitations fall precautions   Weight-Bearing Status - LLE full weight-bearing   Weight-Bearing Status - RLE full weight-bearing   Cognitive Status Examination   Orientation orientation to person, place and time   Level of Consciousness alert   Follows Commands and Answers Questions 100% of the time   Personal Safety and Judgment intact   Memory intact   Pain Assessment   Patient Currently in Pain Yes, see Vital Sign flowsheet   Integumentary/Edema   Integumentary/Edema Comments bruising on forhead and face   Range of Motion (ROM)   ROM Quick Adds No deficits were identified   Strength   Manual Muscle Testing Quick Adds No deficits were identified   Bed Mobility   Bed Mobility Comments SBA   Transfer Skills   Transfer Comments SBA   Gait   Gait Comments SBA   Balance   Balance Comments good   Sensory Examination   Sensory Perception Comments appears intact to light touch in LEs   Coordination   Coordination no deficits were identified   Clinical Impression   Criteria for Skilled Therapeutic Intervention evaluation only   PT Diagnosis impaired mobility   Influenced by the following impairments pain and fatigue   Functional limitations due to impairments activity tolerance   Clinical Presentation Stable/Uncomplicated    Clinical Decision Making (Complexity) Low complexity   Predicted Duration of Therapy Intervention (days/wks) 1 day   Anticipated Equipment Needs at Discharge (none)   Anticipated Discharge Disposition Home  (with family)   Risk & Benefits of therapy have been explained Yes   Patient, Family & other staff in agreement with plan of care Yes   1x Eval Only-Outpatient/Observation Medicare G-Code   G-code Mobility: Walking & Moving Around   Mobility: Walking & Moving Around   Mobility: Current Status , Goal , Discharge -Hiyz Only- Modifier the same for all G-codes CI: 1-19% impairment   Total Evaluation Time   Total Evaluation Time (Minutes) 15

## 2018-09-13 NOTE — PROGRESS NOTES
:    Patient is discharging today to home with sister.  It appears patient does not have any medical insurance.  I called Inna  and she confirmed that he does not have any coverage.  I met with patient and his sister in room and gave them resources to apply for MA again.  Also patient was going to need his Keppra filled and did not have the money.  I called retail pharmacy and they could bill it under [facility] for 1 month.  Patient is aware and in a agreement with discharge plan.  No further needs at this time.    LUC Pichardo on 9/13/2018 at 11:10 AM

## 2018-09-13 NOTE — PROGRESS NOTES
"NS ADMISSION NOTE    Patient admitted to room 301 at approximately 1839 via wheel chair from emergency room. Patient was accompanied by nurse.     Verbal SBAR report received from FAB Wolff prior to patient arrival.     Patient ambulated to bed independently. Patient alert and oriented X 3. Patient having pain in left shoulder. Awaiting pain medication order. 0-10 Pain Scale: 0. Admission vital signs: Blood pressure 138/89, pulse 144, temperature 99.2  F (37.3  C), temperature source Tympanic, resp. rate 12, height 1.676 m (5' 6\"), weight 79 kg (174 lb 2.6 oz), SpO2 97 %. Patient was oriented to plan of care, call light, bed controls, tv, telephone, bathroom and visiting hours.     Risk Assessment    The following safety risks were identified during admission: fall and seizure. Yellow risk band applied: YES.     Skin Initial Assessment    This writer admitted this patient and completed a full skin assessment and Dimitris score in the Adult PCS flowsheet. Appropriate interventions initiated as needed.               Lisa Pepe RN on 9/12/2018 at 1839    "

## 2018-09-13 NOTE — PROGRESS NOTES
09/13/18 1300   Quick Adds   Type of Visit Initial Occupational Therapy Evaluation   Living Environment   Lives With alone   Living Arrangements house   Home Accessibility no concerns   Functional Level Prior   Ambulation 0-->independent   Transferring 0-->independent   Toileting 0-->independent   Bathing 0-->independent   Dressing 0-->independent   Eating 0-->independent   Communication 0-->understands/communicates without difficulty   Swallowing 0-->swallows foods/liquids without difficulty   Cognition 0 - no cognition issues reported   Cognitive Status Examination   Orientation orientation to person, place and time   Level of Consciousness alert   Able to Follow Commands WNL/WFL   Personal Safety (Cognitive) good awareness, safety precautions   Attention No deficits were identified   Visual Perception   Visual Perception No deficits were identified   Pain Assessment   Patient Currently in Pain No   Range of Motion (ROM)   ROM Quick Adds No deficits were identified   Mobility   Bed Mobility Bed mobility skill: Supine to sit   Bed Mobility Skill: Supine to Sit   Level of [place]: Supine/Sit independent   Transfer Skill: Bed to Chair/Chair to Bed   Level of [place]: Bed to Chair independent   Transfer Skill: Sit to Stand   Level of [place]: Sit/Stand independent   Tub/Shower Transfer   Tub/Shower Transfer Transfer Skill: Tub/Shower Transfers   Transfer Skill: Tub/Shower Transfer   Level of [place]: Tub/Shower independent   Bathing   Level of [place] independent   Upper Body Dressing   Level of [place]: Dress Upper Body independent   Lower Body Dressing   Level of [place]: Dress Lower Body independent   Grooming   Level of [place]: Grooming independent   Clinical Impression   Criteria for Skilled Therapeutic Interventions Met evaluation only   OT Diagnosis assess cognition and balance    Clinical Decision Making (Complexity) Low complexity   Therapy Frequency (N/A)    Predicted Duration of Therapy Intervention (days/wks) (eval only)   Anticipated Equipment Needs at Discharge (none)   Anticipated Discharge Disposition Home   Risks and Benefits of Treatment have been explained. Yes   Patient, Family & other staff in agreement with plan of care Yes   1x Eval Only-Outpatient/Observation Medicare G-Code   G-code Self Care   Self Care   Self Care: Current Status , Goal ,  Discharge -Shwr Only- Modifier the same for all G-codes CI: 1-19% impairment   Total Evaluation Time   Total Evaluation Time (Minutes) 15

## 2018-09-13 NOTE — PROGRESS NOTES
[facility] [place]    Patient discharged to home at 1113. Accompanied by Sister. Discharge instructions reviewed with patient and patient's sister, opportunity offered to ask questions. Pharmacy and Social spoke with pt before discharge. Prescriptions sent to [facility]. All belongings sent with patient. Pt notified that sutures can come out at follow up appointment on the 24th of September, or pt can come in if they are bothering him after 7 days.     Karely Ashley

## 2018-09-13 NOTE — CONSULTS
[facility]    Surgical Consultation    Date of Admission:  9/12/2018    Assessment & Plan   Omar Barrios is a 53 year old male who was admitted on 9/12/2018. I was asked to see the patient for trauma.    Active Problems:    Fall at home    Assessment: acute    Plan: seizure then fall. Forehead laceration/contusion. Neurostatus stable. Has shoulder pain can follow up as out patient. Seizure medication management per primary. No other injury noted.      DVT Prophylaxis: Defer to primary service  Code Status: Full Code    Kierra Quinteros    Reason for Consult   Reason for consult: I was asked by Dr. Weaver to evaluate this patient for trauma.    Primary Care Physician   Provider Not In System    Chief Complaint   Fall with head laceration    History is obtained from the patient and chart review    History of Present Illness   Omar Barrios is a 53 year old male who presents with fall after having a seizure. He struck his forehead. He was     Past Medical History    I have reviewed this patient's medical history and updated it with pertinent information if needed.   Past Medical History:   Diagnosis Date     Seizure disorder (H)        Past Surgical History   I have reviewed this patient's surgical history and updated it with pertinent information if needed.  Past Surgical History:   Procedure Laterality Date     CRANIOTOMY         Prior to Admission Medications   None     Allergies   No Known Allergies    Social History   I have reviewed this patient's social history and updated it with pertinent information if needed. Omar Barrios      Family History   I have reviewed this patient's family history and updated it with pertinent information if needed.   No family history on file.    REVIEW OF SYSTEMS  GENERAL: No fevers or chills. Denies fatigue, recent weight loss.  HEENT: No sinus drainage. No changes with vision or hearing. No difficulty swallowing.   LYMPHATICS:  No swollen nodes in axilla,  neck or groin.  CARDIOVASCULAR: Denies chest pain, palpitations and dyspnea on exertion.  PULMONARY: No shortness of breath or cough. No increase in sputum production.  GI:Denies melena, bright red blood in stools. No hematemesis. No constipation or diarrhea.  : No dysuria or hematuria.  SKIN: No recent rashes or ulcers.   HEMATOLOGY:  No history of easy bruising or bleeding.  ENDOCRINE:  No history of diabetes or thyroid problems.  NEUROLOGY:  Has frequent seizures. No motor or sensory changes since the fall today.    Physical Exam   Temp: 98.2  F (36.8  C) Temp src: Tympanic BP: 122/81 Pulse: 144 Heart Rate: 112 Resp: 20 SpO2: 98 % O2 Device: None (Room air)    Vital Signs with Ranges  Temp:  [96  F (35.6  C)-99.2  F (37.3  C)] 98.2  F (36.8  C)  Pulse:  [144] 144  Heart Rate:  [112-134] 112  Resp:  [8-24] 20  BP: (117-158)/() 122/81  SpO2:  [94 %-99 %] 98 %  174 lbs 2.61 oz    Constitutional: NAD, pleasant and cooperative  HEENT:  no icterus, no nasal drainage, no oral lesions, mucus membranes pink and moist  Respiratory: lungs clear to ausculation bilaterally, no respiratory distress  Cardiovascular: heart regular rate, tacchycardia, no murmur  Abdomen: bowel sounds +, soft and non-distended, no tenderness, no peritoneal signs  Lymph/Hematologic: No axillary or cervical adenopathy  Skin: pink, warm and dry. No rash or ulceration. Laceration forehead approximated, associated contusion.  Musculoskeletal: calves soft and non-tender  Neurologic: grossly intact, AAO x 3  Psychiatric: appropriate affect    Data   -Data reviewed today: All pertinent laboratory and imaging results from this encounter were reviewed. I personally reviewed the head CT image(s) showing no acute findings.    Recent Labs  Lab 09/12/18  1346   WBC 14.6*   HGB 14.5   MCV 90         POTASSIUM 4.3   CHLORIDE 100   CO2 18*   BUN 24   CR 1.46*   ANIONGAP 20*   JONES 10.6*   *   ALBUMIN 4.8   PROTTOTAL 8.3   BILITOTAL 0.4    ALKPHOS 107*   ALT 33   AST 41*       Recent Results (from the past 24 hour(s))   CT Head w/o Contrast    Narrative    PROCEDURE: CT HEAD W/O CONTRAST     HISTORY: Seizure with trauma from falling and hitting head; .    COMPARISON: None.    TECHNIQUE: Helical Images were obtained from the skull base to the  vertex. Axial, coronal and sagittal images were reviewed.     FINDINGS: There is mild, diffuse atrophy. No acute intracranial  hemorrhage, mass effect,  or midline shift.    The grey-white matter interface is preserved. Scattered  hypoattenuation within the white matter is most compatible with  microvascular ischemic change. Encephalomalacia is noted in the left  inferior temporal region.    The mastoid air cells are clear.  There are postoperative changes from  right frontoparietal craniotomy. No acute skull fractures are seen.       Impression    IMPRESSION: No acute intracranial findings.      ROGER DUARTE MD   CT Cervical Spine w/o Contrast    Narrative    PROCEDURE: CT CERVICAL SPINE W/O CONTRAST     HISTORY: Seizure which led to a fall striking his head; .    TECHNIQUE: Helical noncontrast CT images of the cervical spine.    COMPARISON: None.    FINDINGS:     No acute fracture is identified. The vertebral bodies are normal in  height. The cervical lordosis is straightened. The C1-2 articulation  and the craniocervical junction are intact.     Diffuse cervical degenerative changes are present, most pronounced at  C6-7 and C7-T1 without severe spinal stenosis. Right foraminal  stenosis at C7-T1 is moderate to severe.     The paravertebral soft tissues are unremarkable. The lung apices  demonstrate mild emphysema.      Impression    IMPRESSION: No evidence of acute cervical spine fracture.    RALEIGH THURMAN MD   XR Shoulder Left Port G/E 2 Views    Narrative    PROCEDURE:  XR SHOULDER LT PORT G/E 2 VW    HISTORY: Trauma to shoulder after a seizure;     COMPARISON:  None.    TECHNIQUE:  2 views of the  left shoulder were obtained.    FINDINGS:  There is deformity of the glenohumeral joint, the majority  which is likely due to degenerative disease. There is joint space  narrowing and osteophytes. There are mild degenerative changes of the  left acromioclavicular joint.       Impression    IMPRESSION: Deformity of the glenohumeral joint, most likely  degenerative. This makes it difficult to exclude a subtle fracture. If  there is persistent pain, consider follow-up CT.      ROGER DUARTE MD   XR Chest Port 1 View    Narrative    PROCEDURE:  XR CHEST PORT 1 VW    HISTORY:  Trauma to left shoulder after seizure; .     COMPARISON:  None.    FINDINGS:   The cardiac silhouette is normal in size. The pulmonary vasculature is  normal.  The lungs are clear. No pleural effusion or pneumothorax.      Impression    IMPRESSION:  No acute cardiopulmonary disease.      ROGER DUARTE MD

## 2018-09-13 NOTE — PLAN OF CARE
Problem: Patient Care Overview  Goal: Plan of Care/Patient Progress Review  Physical Therapy Discharge Summary    Reason for therapy discharge:    Discharged to home.    Progress towards therapy goal(s). See goals on Care Plan in [facility] electronic health record for goal details.  Goals met    Therapy recommendation(s):    No further therapy is recommended.

## 2018-09-13 NOTE — DISCHARGE SUMMARY
"[facility]    Discharge Summary  Hospitalist    Date of Admission:  9/12/2018  Date of Discharge:  9/13/2018  Discharging Provider: Charles Weaver  Date of Service (when I saw the patient): 09/13/18    Discharge Diagnoses     Fall at home with concussion (9/12/2018)    Seizure disorder    Positive for methamphetamines      History of Present Illness   Omar Barrios is an 53 year old male who presented with a fall. Per the H&P:  \"Omar Barrios is a 53 year old male who presents with a fall and striking his head on what appears to be the edge of the oven in the kitchen.  So had seizure-like activity while on the ground after the fall and was postictal and not very responsive.  The fall was not witnessed.  It is unclear if the seizure started first and caused the fall or if the fall and striking the head initiated a seizure.  He presented to the emergency department with a large forehead laceration that was sutured by Dr. Walters.  Patient had a difficult time giving any history and was not very responsive initially.  This is improved during his hospital stay and he was alert and visiting with me when I saw him.  He takes no blood thinners on a chronic basis.  He takes no medications at this time, and is reportedly off of any prescription medications since at least May 2018.  This includes the Keppra 1500 mg twice daily.  He has a history of drug use but denies using anything for at least the past 30 days.  He recently was released from intermediate.\"    Hospital Course   Omar Barrios was admitted on 9/12/2018.  The following problems were addressed during his hospitalization:    Fall  Patient was observed overnight in the hospital.  Trauma consult was called and Dr. Quinteros was consulted.  He remained neurologically intact with no loss of consciousness or seizures.  He was alert and oriented.  Malaise of some left shoulder pain but has full range of motion.  X-ray of the shoulder showed old injuries " and a fracture was not ruled out.  However with his good range of motion I do not believe that he has a fracture.  He was given 12 Percocet for pain on discharge.    Seizure  The patient has a history of seizures.  He has been off his Keppra for 3-4 months at minimum.  He was restarted at 500 twice daily and should up titrate on follow-up with Dr. Tello.    Positive for methamphetamine  Patient's urine tox screen was positive for methamphetamine.  He admits to this use.    # Discharge Pain Plan:   - During his hospitalization, Omar experienced pain due to fall.  The pain plan for discharge was discussed with Omar and the plan was created in a collaborative fashion.    - Opioids prescribed on discharge: percocet  - Duration of opioids after discharge: Per CDC opioid prescribing guidelines, a 3 day prescription of opioids was provided.  - Bowel regimen: not needed    Charles Weaver MD      Code Status   Full Code       Primary Care Physician   [facility]    Physical Exam   Temp: 97.7  F (36.5  C) Temp src: Oral BP: 137/76 Pulse: 144 Heart Rate: 92 Resp: 18 SpO2: 98 % O2 Device: None (Room air)    Vitals:    09/12/18 1325 09/12/18 1848 09/13/18 0336   Weight: 81.6 kg (180 lb) 79 kg (174 lb 2.6 oz) 77.3 kg (170 lb 6.4 oz)     Vital Signs with Ranges  Temp:  [96  F (35.6  C)-99.2  F (37.3  C)] 97.7  F (36.5  C)  Pulse:  [144] 144  Heart Rate:  [] 92  Resp:  [8-24] 18  BP: (117-158)/() 137/76  SpO2:  [94 %-99 %] 98 %  I/O last 3 completed shifts:  In: -   Out: 500 [Urine:500]    GENERAL: Comfortable, no apparent distress.  CARDIOVASCULAR: regular rate and rhythm, no murmur. No lower extremity edema   RESPIRATORY: Clear to auscultation bilaterally, no wheezes or crackles.  GI: non-tender, non-distended, normal bowel sounds.   SKIN: warm periphery, no rashes      Discharge Disposition   Discharged to home  Condition at discharge: Stable    Consultations This Hospital Stay   SOCIAL WORK IP  CONSULT  PHYSICAL THERAPY ADULT IP CONSULT  OCCUPATIONAL THERAPY ADULT IP CONSULT  ADVANCE DIRECTIVE IP CONSULT    Time Spent on this Encounter   I, Charles Weaver, personally saw the patient today and spent greater than 30 minutes discharging this patient.    Discharge Orders     Reason for your hospital stay   Fall with seizure     Follow-up and recommended labs and tests   Follow up with Dr. Jake Tello  on 9/24 at 2PM.     Activity   Your activity upon discharge: activity as tolerated     Full Code     Diet   Follow this diet upon discharge: Orders Placed This Encounter     Combination Diet Regular Diet Adult         Discharge Medications   Current Discharge Medication List      START taking these medications    Details   levETIRAcetam (KEPPRA) 500 MG tablet Take 1 tablet (500 mg) by mouth 2 times daily  Qty: 60 tablet, Refills: 1    Associated Diagnoses: Recurrent seizures (H)      oxyCODONE-acetaminophen (PERCOCET) 5-325 MG per tablet Take 1 tablet by mouth every 6 hours as needed for pain  Qty: 12 tablet, Refills: 0    Associated Diagnoses: Fall in home, initial encounter           Allergies   No Known Allergies  Data   Most Recent 3 CBC's:  Recent Labs   Lab Test  09/13/18   0450  09/12/18   1346   WBC  9.9  14.6*   HGB  13.2*  14.5   MCV  92  90   PLT  216  267      Most Recent 3 BMP's:  Recent Labs   Lab Test  09/13/18   0450  09/12/18   1346   NA  135  138   POTASSIUM  3.4*  4.3   CHLORIDE  101  100   CO2  23  18*   BUN  17  24   CR  1.02  1.46*   ANIONGAP  11  20*   JONES  9.7  10.6*   GLC  101  137*     Most Recent 2 LFT's:  Recent Labs   Lab Test  09/12/18   1346   AST  41*   ALT  33   ALKPHOS  107*   BILITOTAL  0.4     Most Recent INR's and Anticoagulation Dosing History:  Anticoagulation Dose History     There is no flowsheet data to display.        Most Recent 3 Troponin's:No lab results found.  Most Recent Cholesterol Panel:No lab results found.  Most Recent 6 Bacteria Isolates From Any Culture  (See EPIC Reports for Culture Details):No lab results found.  Most Recent TSH, T4 and A1c Labs:No lab results found.  Results for orders placed or performed during the hospital encounter of 09/12/18   CT Cervical Spine w/o Contrast    Narrative    PROCEDURE: CT CERVICAL SPINE W/O CONTRAST     HISTORY: Seizure which led to a fall striking his head; .    TECHNIQUE: Helical noncontrast CT images of the cervical spine.    COMPARISON: None.    FINDINGS:     No acute fracture is identified. The vertebral bodies are normal in  height. The cervical lordosis is straightened. The C1-2 articulation  and the craniocervical junction are intact.     Diffuse cervical degenerative changes are present, most pronounced at  C6-7 and C7-T1 without severe spinal stenosis. Right foraminal  stenosis at C7-T1 is moderate to severe.     The paravertebral soft tissues are unremarkable. The lung apices  demonstrate mild emphysema.      Impression    IMPRESSION: No evidence of acute cervical spine fracture.    RALEIGH THURMAN MD   CT Head w/o Contrast    Narrative    PROCEDURE: CT HEAD W/O CONTRAST     HISTORY: Seizure with trauma from falling and hitting head; .    COMPARISON: None.    TECHNIQUE: Helical Images were obtained from the skull base to the  vertex. Axial, coronal and sagittal images were reviewed.     FINDINGS: There is mild, diffuse atrophy. No acute intracranial  hemorrhage, mass effect,  or midline shift.    The grey-white matter interface is preserved. Scattered  hypoattenuation within the white matter is most compatible with  microvascular ischemic change. Encephalomalacia is noted in the left  inferior temporal region.    The mastoid air cells are clear.  There are postoperative changes from  right frontoparietal craniotomy. No acute skull fractures are seen.       Impression    IMPRESSION: No acute intracranial findings.      ROGER DUARTE MD   XR Chest Port 1 View    Narrative    PROCEDURE:  XR CHEST PORT 1  VW    HISTORY:  Trauma to left shoulder after seizure; .     COMPARISON:  None.    FINDINGS:   The cardiac silhouette is normal in size. The pulmonary vasculature is  normal.  The lungs are clear. No pleural effusion or pneumothorax.      Impression    IMPRESSION:  No acute cardiopulmonary disease.      ROGER DUARTE MD   XR Shoulder Left Port G/E 2 Views    Narrative    PROCEDURE:  XR SHOULDER LT PORT G/E 2 VW    HISTORY: Trauma to shoulder after a seizure;     COMPARISON:  None.    TECHNIQUE:  2 views of the left shoulder were obtained.    FINDINGS:  There is deformity of the glenohumeral joint, the majority  which is likely due to degenerative disease. There is joint space  narrowing and osteophytes. There are mild degenerative changes of the  left acromioclavicular joint.       Impression    IMPRESSION: Deformity of the glenohumeral joint, most likely  degenerative. This makes it difficult to exclude a subtle fracture. If  there is persistent pain, consider follow-up CT.      ROGER DUARTE MD

## 2018-09-13 NOTE — PLAN OF CARE
"Problem: Wound (Includes Pressure Injury) (Adult)  Goal: Signs and Symptoms of Listed Potential Problems Will be Absent, Minimized or Managed (Wound)  Signs and symptoms of listed potential problems will be absent, minimized or managed by discharge/transition of care   Vital signs stable. Denies headache. \" A little sore only\". Complains of moderate shoulder pain. Tylenol given with relief. Neuros WDL. Stand by assist. No seizure activity noted.       "

## 2018-09-13 NOTE — PHARMACY - DISCHARGE MEDICATION RECONCILIATION AND EDUCATION
Pharmacy:  Discharge Counseling and Medication Reconciliation    Omar Barrios  [address]  [facility] [address]  [phone] (home)   53 year old male  PCP: [facility]    Allergies: Review of patient's allergies indicates no known allergies.    Discharge Counseling:    Pharmacist met with patient (and/or family) today to review the medication portion of the After Visit Summary (with an emphasis on NEW medications) and to address patient's questions/concerns.    Summary of Education: Met with patient and his sister at time of discharge to review new medication: oxycodone/acetaminophen. Discussed indication, directions for use, and possible side effects of the medication. Also reviewed new dose/directions to resume Keppra. Patient expressed understanding and all questions and concerns were addressed.    Materials Provided:  MedCounselor sheets printed from Clinical Pharmacology on: Oxycodone/Acetaminophen and Keppra    Discharge Medication Reconciliation:    Rita Crawford RPH has reviewed the patient's discharge medication orders and has compared them to the inpatient medication administration record and to what the patient was taking prior to admission - any discrepancies have been resolved.    It has been determined that the patient has an adequate supply of medications available or which can be obtained from the patient's preferred pharmacy, which he has confirmed as: [facility].    Thank you for the consult.    Rita Crawford RPH........September 13, 2018 11:12 AM

## 2018-09-13 NOTE — PLAN OF CARE
Problem: Patient Care Overview  Goal: Plan of Care/Patient Progress Review  VS stable. Rested small amount. Neuros WDL. Denies pain/discomfort. No seizure activity.

## 2018-10-05 ENCOUNTER — OFFICE VISIT (OUTPATIENT)
Dept: INTERNAL MEDICINE | Facility: OTHER | Age: 53
End: 2018-10-05
Attending: INTERNAL MEDICINE
Payer: COMMERCIAL

## 2018-10-05 ENCOUNTER — TELEPHONE (OUTPATIENT)
Dept: INTERNAL MEDICINE | Facility: OTHER | Age: 53
End: 2018-10-05

## 2018-10-05 VITALS
HEART RATE: 80 BPM | BODY MASS INDEX: 28.25 KG/M2 | DIASTOLIC BLOOD PRESSURE: 84 MMHG | WEIGHT: 175 LBS | SYSTOLIC BLOOD PRESSURE: 122 MMHG

## 2018-10-05 DIAGNOSIS — Z87.898 HISTORY OF SEIZURE: Primary | ICD-10-CM

## 2018-10-05 DIAGNOSIS — F19.10 DRUG ABUSE (H): ICD-10-CM
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
## 2018-10-05 PROCEDURE — 99214 OFFICE O/P EST MOD 30 MIN: CPT | Performed by: INTERNAL MEDICINE

## 2018-10-05 PROCEDURE — G0463 HOSPITAL OUTPT CLINIC VISIT: HCPCS

## 2018-10-05 RX ORDER — OXYCODONE AND ACETAMINOPHEN 5; 325 MG/1; MG/1
1 TABLET ORAL EVERY 6 HOURS PRN
Qty: 12 TABLET | Refills: 0 | COMMUNITY
Start: 2018-10-05 | End: 2018-10-05

## 2018-10-05 ASSESSMENT — ENCOUNTER SYMPTOMS
ENDOCRINE NEGATIVE: 1
CONSTITUTIONAL NEGATIVE: 1
ALLERGIC/IMMUNOLOGIC NEGATIVE: 1
HEMATOLOGIC/LYMPHATIC NEGATIVE: 1

## 2018-10-05 ASSESSMENT — ANXIETY QUESTIONNAIRES
7. FEELING AFRAID AS IF SOMETHING AWFUL MIGHT HAPPEN: NOT AT ALL
6. BECOMING EASILY ANNOYED OR IRRITABLE: NOT AT ALL
2. NOT BEING ABLE TO STOP OR CONTROL WORRYING: NOT AT ALL
GAD7 TOTAL SCORE: 0
5. BEING SO RESTLESS THAT IT IS HARD TO SIT STILL: NOT AT ALL
1. FEELING NERVOUS, ANXIOUS, OR ON EDGE: NOT AT ALL
3. WORRYING TOO MUCH ABOUT DIFFERENT THINGS: NOT AT ALL
IF YOU CHECKED OFF ANY PROBLEMS ON THIS QUESTIONNAIRE, HOW DIFFICULT HAVE THESE PROBLEMS MADE IT FOR YOU TO DO YOUR WORK, TAKE CARE OF THINGS AT HOME, OR GET ALONG WITH OTHER PEOPLE: NOT DIFFICULT AT ALL

## 2018-10-05 ASSESSMENT — PATIENT HEALTH QUESTIONNAIRE - PHQ9: 5. POOR APPETITE OR OVEREATING: NOT AT ALL
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
## 2018-10-05 NOTE — TELEPHONE ENCOUNTER
Patient requesting appointment to be seen today for paperwork.  He states the form is to qualify him for treatment in [place].  Call transferred to scheduling for assistance in setting up appointment.    Archana Dunaway LPN............10/5/2018 12:58 PM

## 2018-10-05 NOTE — NURSING NOTE
"The patient is here today to have forms filled out for a treatment facility.  Kisha Oseguera LPN on 10/5/2018 at 2:51 PM  Chief Complaint   Patient presents with     Forms       Initial /84 (BP Location: Right arm, Patient Position: Sitting, Cuff Size: Adult Large)  Pulse 80  Wt 175 lb (79.4 kg)  BMI 28.25 kg/m2 Estimated body mass index is 28.25 kg/(m^2) as calculated from the following:    Height as of 9/12/18: 5' 6\" (1.676 m).    Weight as of this encounter: 175 lb (79.4 kg).  Medication Reconciliation: complete    Kisha Oseguera LPN    "

## 2018-10-05 NOTE — MR AVS SNAPSHOT
"              After Visit Summary   10/5/2018    Omar Barrios    MRN: [id number]           Patient Information     Date Of Birth          1965        Visit Information        Provider Department      10/5/2018 2:40 PM Jake Tello MD [facility]        Today's Diagnoses     History of seizure    -  1    Fall in home, initial encounter        Drug abuse (H)           Follow-ups after your visit        Who to contact     If you have questions or need follow up information about today's clinic visit or your schedule please contact [facility] directly at [phone].  Normal or non-critical lab and imaging results will be communicated to you by Robotronicahart, letter or phone within 4 business days after the clinic has received the results. If you do not hear from us within 7 days, please contact the clinic through Hobleet or phone. If you have a critical or abnormal lab result, we will notify you by phone as soon as possible.  Submit refill requests through R2G or call your pharmacy and they will forward the refill request to us. Please allow 3 business days for your refill to be completed.          Additional Information About Your Visit        MyChart Information     R2G lets you send messages to your doctor, view your test results, renew your prescriptions, schedule appointments and more. To sign up, go to [email or web address] . Click on \"Log in\" on the left side of the screen, which will take you to the Welcome page. Then click on \"Sign up Now\" on the right side of the page.     You will be asked to enter the access code listed below, as well as some personal information. Please follow the directions to create your username and password.     Your access code is: [email or web address]  Expires: 2018 10:15 AM     Your access code will  in 90 days. If you need help or a new code, please call your [facility] clinic or [phone].        Care EveryWhere ID     " This is your Care EveryWhere ID. This could be used by other organizations to access your [facility] medical records  [id number]        Your Vitals Were     Pulse BMI (Body Mass Index)                80 28.25 kg/m2           Blood Pressure from Last 3 Encounters:   10/05/18 122/84   09/13/18 137/76   08/18/16 122/80    Weight from Last 3 Encounters:   10/05/18 175 lb (79.4 kg)   09/13/18 170 lb 6.4 oz (77.3 kg)   08/18/16 168 lb 8 oz (76.4 kg)              Today, you had the following     No orders found for display         Today's Medication Changes          These changes are accurate as of 10/5/18  3:25 PM.  If you have any questions, ask your nurse or doctor.               These medicines have changed or have updated prescriptions.        Dose/Directions    levETIRAcetam 500 MG tablet   Commonly known as:  KEPPRA   This may have changed:  Another medication with the same name was removed. Continue taking this medication, and follow the directions you see here.   Used for:  Recurrent seizures (H)   Changed by:  Jake Tello MD        Dose:  500 mg   Take 1 tablet (500 mg) by mouth 2 times daily   Quantity:  60 tablet   Refills:  1         Stop taking these medicines if you haven't already. Please contact your care team if you have questions.     buPROPion 150 MG 12 hr tablet   Commonly known as:  WELLBUTRIN SR   Stopped by:  Jake Tello MD           ketoconazole 2 % shampoo   Commonly known as:  NIZORAL   Stopped by:  Jake Tello MD           lisdexamfetamine 60 MG capsule   Commonly known as:  VYVANSE   Stopped by:  Jake Tello MD           oxyCODONE-acetaminophen 5-325 MG per tablet   Commonly known as:  PERCOCET   Stopped by:  Jake Tello MD                    Primary Care Provider Office Phone # Fax #    [facility] [phone] [address] [employer] [facility] [address]        Equal Access to Services     HERRERA BROOKS AH: vasiliy Haley  nydia espinomadonell brunohomarclaudia marleyrandy ruddydeidra whaley. So [facility] [phone].    ATENCIÓN: Si avtar smart, tiene a domingo disposición servicios gratuitos de asistencia lingüística. Llame al [phone].    We comply with applicable federal civil rights laws and [place] laws. We do not discriminate on the basis of race, color, national origin, age, disability, sex, sexual orientation, or gender identity.            Thank you!     Thank you for choosing [facility] [place] [facility]\""  for your care. Our goal is always to provide you with excellent care. Hearing back from our patients is one way we can continue to improve our services. Please take a few minutes to complete the written survey that you may receive in the mail after your visit with us. Thank you!             Your Updated Medication List - Protect others around you: Learn how to safely use, store and throw away your medicines at www.disposemymeds.org.          This list is accurate as of 10/5/18  3:25 PM.  Always use your most recent med list.                   Brand Name Dispense Instructions for use Diagnosis    levETIRAcetam 500 MG tablet    KEPPRA    60 tablet    Take 1 tablet (500 mg) by mouth 2 times daily    Recurrent seizures (H)

## 2018-10-06 ASSESSMENT — PATIENT HEALTH QUESTIONNAIRE - PHQ9: SUM OF ALL RESPONSES TO PHQ QUESTIONS 1-9: 0

## 2018-10-06 ASSESSMENT — ANXIETY QUESTIONNAIRES: GAD7 TOTAL SCORE: 0

## 2018-10-12 ENCOUNTER — TELEPHONE (OUTPATIENT)
Dept: INTERNAL MEDICINE | Facility: OTHER | Age: 53
End: 2018-10-12

## 2018-10-12 DIAGNOSIS — G40.909 RECURRENT SEIZURES (H): ICD-10-CM

## 2018-10-12 RX ORDER — LEVETIRACETAM 500 MG/1
500 TABLET ORAL 2 TIMES DAILY
Qty: 180 TABLET | Refills: 3 | Status: SHIPPED | OUTPATIENT
Start: 2018-10-12

## 2018-10-12 NOTE — TELEPHONE ENCOUNTER
The patient will be out of his keppra tomorrow and needs a new script sent in to his pharmacy. The order is teed up below.  Kisha Oseguera LPN on 10/12/2018 at 9:22 AM

## (undated) RX ORDER — LIDOCAINE HYDROCHLORIDE 10 MG/ML
INJECTION, SOLUTION INFILTRATION; PERINEURAL
Status: DISPENSED
Start: 2018-09-12